# Patient Record
Sex: FEMALE | Race: WHITE | Employment: FULL TIME | URBAN - METROPOLITAN AREA
[De-identification: names, ages, dates, MRNs, and addresses within clinical notes are randomized per-mention and may not be internally consistent; named-entity substitution may affect disease eponyms.]

---

## 2022-12-14 ENCOUNTER — OFFICE VISIT (OUTPATIENT)
Dept: SURGERY | Age: 57
End: 2022-12-14
Payer: COMMERCIAL

## 2022-12-14 VITALS
BODY MASS INDEX: 28.17 KG/M2 | SYSTOLIC BLOOD PRESSURE: 155 MMHG | HEART RATE: 79 BPM | DIASTOLIC BLOOD PRESSURE: 90 MMHG | WEIGHT: 165 LBS | HEIGHT: 64 IN

## 2022-12-14 DIAGNOSIS — N64.89 RADIAL SCAR OF BREAST: Primary | ICD-10-CM

## 2022-12-14 PROCEDURE — 99203 OFFICE O/P NEW LOW 30 MIN: CPT | Performed by: SURGERY

## 2022-12-14 RX ORDER — LEVOTHYROXINE SODIUM 50 UG/1
TABLET ORAL
COMMUNITY

## 2022-12-14 NOTE — PROGRESS NOTES
HISTORY OF PRESENT ILLNESS  Telly Huang is a 56 y.o. female. HPI NEW patient consult referred by  Dr. Madhu Feldman for LEFT breast Atypical lobular hyperplasia. 2nd opinion getting radial scar LEFT breast remove    Breast history:  Has had multiple biopsies on both breast.    Family History:  Maternal great aunt breast cancer in her [de-identified]      Mammogram, 6/2/22, [address]  11/30/22-MRI [facility] doctors      Review of Systems   All other systems reviewed and are negative.     Physical Exam    ASSESSMENT and PLAN  {ASSESSMENT/PLAN:05202}

## 2022-12-14 NOTE — PROGRESS NOTES
HISTORY OF PRESENT ILLNESS  Virgilio Rucker is a 56 y.o. female. HPI  NEW patient consult referred by  Dr. Luc Chen for LEFT breast Atypical lobular hyperplasia. 2nd opinion getting radial scar LEFT breast remove     Breast history:  Has had multiple biopsies on both breast.     Family History:  Maternal great aunt breast cancer in her [de-identified]        Mammogram, 6/2/22, [address]  11/30/22-MRI [facility] doctors        No past medical history on file. No past surgical history on file. Social History     Socioeconomic History    Marital status:      Spouse name: Not on file    Number of children: Not on file    Years of education: Not on file    Highest education level: Not on file   Occupational History    Not on file   Tobacco Use    Smoking status: Not on file    Smokeless tobacco: Not on file   Substance and Sexual Activity    Alcohol use: Not on file    Drug use: Not on file    Sexual activity: Not on file   Other Topics Concern    Not on file   Social History Narrative    Not on file     Social Determinants of Health     Financial Resource Strain: Not on file   Food Insecurity: Not on file   Transportation Needs: Not on file   Physical Activity: Not on file   Stress: Not on file   Social Connections: Not on file   Intimate Partner Violence: Not on file   Housing Stability: Not on file       No current outpatient medications on file prior to visit. No current facility-administered medications on file prior to visit. Not on File    OB History    No obstetric history on file. ROS        Physical Exam  Exam conducted with a chaperone present. Constitutional:       Appearance: She is well-developed. She is not diaphoretic. HENT:      Head: Normocephalic and atraumatic. Right Ear: External ear normal.      Left Ear: External ear normal.   Eyes:      General: No scleral icterus. Right eye: No discharge. Left eye: No discharge.       Pupils: Pupils are equal, round, and reactive to light. Neck:      Thyroid: No thyromegaly. Vascular: No JVD. Trachea: No tracheal deviation. Cardiovascular:      Rate and Rhythm: Normal rate and regular rhythm. Heart sounds: Normal heart sounds. Pulmonary:      Effort: Pulmonary effort is normal. No tachypnea, accessory muscle usage or respiratory distress. Breath sounds: Normal breath sounds. No stridor. Chest:   Breasts:     Breasts are symmetrical.      Right: No inverted nipple, mass, nipple discharge, skin change or tenderness. Left: No inverted nipple, mass, nipple discharge, skin change or tenderness. Abdominal:      General: There is no distension. Palpations: Abdomen is soft. There is no mass. Tenderness: There is no abdominal tenderness. Musculoskeletal:         General: Normal range of motion. Cervical back: Normal range of motion and neck supple. Lymphadenopathy:      Cervical: No cervical adenopathy. Skin:     General: Skin is warm and dry. Neurological:      Mental Status: She is alert and oriented to person, place, and time. Psychiatric:         Speech: Speech normal.         Behavior: Behavior normal.         Thought Content: Thought content normal.         Judgment: Judgment normal.           ASSESSMENT and PLAN    ICD-10-CM ICD-9-CM    1. Radial scar of breast  N64.89 611.89         New patient presents for 2nd opinion, and is doing well overall. Upon physical examination noted dense breast but otherwise normal. Patient was advised that surgery is not recommended at this point given amount of time that has passed and due to normal evaluations since 2018. She was informed to continue mammogram and breast MRI. Patient is to be followed in our high risk clinic. Follow up in 1 year. This plan was reviewed with the patient and patient agrees. All questions were answered. Total time spent was 40 minutes.       Written by Rahel Rousseau, as dictated by Dr. Chantel Lema MD.

## 2022-12-14 NOTE — LETTER
12/27/2022 1:47 PM    Patient:  Virgilio Rucker   YOB: 1965  Date of Visit: 12/14/2022      Dear Dr. Rogelio Quinones: Thank you for referring Ms. Virgilio Rucker to me for evaluation/treatment. Below are the relevant portions of my assessment and plan of care. If you have questions, please do not hesitate to call me. I look forward to following Ms. Guilherme Swift along with you.         Sincerely,      Anu Nj MD

## 2022-12-16 ENCOUNTER — DOCUMENTATION ONLY (OUTPATIENT)
Dept: SURGERY | Age: 57
End: 2022-12-16

## 2022-12-16 NOTE — PROGRESS NOTES
Type of Film: [x] CD [] FILMS  Type of Test: [] MRI [x] MAMMO  From: [facility]  Given to: [facility]  LOCATION  To be Downloaded into PACS:  YES

## 2023-01-26 ENCOUNTER — DOCUMENTATION ONLY (OUTPATIENT)
Dept: SURGERY | Age: 58
End: 2023-01-26

## 2023-05-19 RX ORDER — LEVOTHYROXINE SODIUM 0.05 MG/1
TABLET ORAL
COMMUNITY